# Patient Record
Sex: FEMALE | Race: WHITE | NOT HISPANIC OR LATINO | Employment: FULL TIME | URBAN - METROPOLITAN AREA
[De-identification: names, ages, dates, MRNs, and addresses within clinical notes are randomized per-mention and may not be internally consistent; named-entity substitution may affect disease eponyms.]

---

## 2017-03-03 ENCOUNTER — OFFICE VISIT (OUTPATIENT)
Dept: FAMILY MEDICINE | Facility: CLINIC | Age: 27
End: 2017-03-03
Payer: COMMERCIAL

## 2017-03-03 VITALS
DIASTOLIC BLOOD PRESSURE: 72 MMHG | HEIGHT: 61 IN | WEIGHT: 126.8 LBS | TEMPERATURE: 97.6 F | SYSTOLIC BLOOD PRESSURE: 114 MMHG | BODY MASS INDEX: 23.94 KG/M2 | HEART RATE: 66 BPM | OXYGEN SATURATION: 98 %

## 2017-03-03 DIAGNOSIS — J45.20 INTERMITTENT ASTHMA, UNCOMPLICATED: ICD-10-CM

## 2017-03-03 DIAGNOSIS — R10.13 DYSPEPSIA: ICD-10-CM

## 2017-03-03 DIAGNOSIS — K92.1 MELENA: Primary | ICD-10-CM

## 2017-03-03 LAB
ERYTHROCYTE [DISTWIDTH] IN BLOOD BY AUTOMATED COUNT: 13.6 % (ref 10–15)
FERRITIN SERPL-MCNC: 27 NG/ML (ref 12–150)
HCT VFR BLD AUTO: 40.2 % (ref 35–47)
HGB BLD-MCNC: 13.1 G/DL (ref 11.7–15.7)
IRON SATN MFR SERPL: 18 % (ref 15–46)
IRON SERPL-MCNC: 68 UG/DL (ref 35–180)
MCH RBC QN AUTO: 29.4 PG (ref 26.5–33)
MCHC RBC AUTO-ENTMCNC: 32.6 G/DL (ref 31.5–36.5)
MCV RBC AUTO: 90 FL (ref 78–100)
PLATELET # BLD AUTO: 326 10E9/L (ref 150–450)
RBC # BLD AUTO: 4.45 10E12/L (ref 3.8–5.2)
TIBC SERPL-MCNC: 372 UG/DL (ref 240–430)
TSH SERPL DL<=0.005 MIU/L-ACNC: 2.32 MU/L (ref 0.4–4)
WBC # BLD AUTO: 4.9 10E9/L (ref 4–11)

## 2017-03-03 PROCEDURE — 83550 IRON BINDING TEST: CPT | Performed by: INTERNAL MEDICINE

## 2017-03-03 PROCEDURE — 36415 COLL VENOUS BLD VENIPUNCTURE: CPT | Performed by: INTERNAL MEDICINE

## 2017-03-03 PROCEDURE — 82728 ASSAY OF FERRITIN: CPT | Performed by: INTERNAL MEDICINE

## 2017-03-03 PROCEDURE — 84443 ASSAY THYROID STIM HORMONE: CPT | Performed by: INTERNAL MEDICINE

## 2017-03-03 PROCEDURE — 83540 ASSAY OF IRON: CPT | Performed by: INTERNAL MEDICINE

## 2017-03-03 PROCEDURE — 99214 OFFICE O/P EST MOD 30 MIN: CPT | Performed by: INTERNAL MEDICINE

## 2017-03-03 PROCEDURE — 85027 COMPLETE CBC AUTOMATED: CPT | Performed by: INTERNAL MEDICINE

## 2017-03-03 RX ORDER — ALBUTEROL SULFATE 90 UG/1
2 AEROSOL, METERED RESPIRATORY (INHALATION) EVERY 6 HOURS PRN
Qty: 1 INHALER | Refills: 11 | Status: SHIPPED | OUTPATIENT
Start: 2017-03-03 | End: 2020-07-03

## 2017-03-03 NOTE — PROGRESS NOTES
"[facility]  CLINIC PROGRESS NOTE    Subjective:  Intermittent asthma, uncomplicated    No issues, has needed albuterol prior to exercise only  Dyspepsia   Has had symptoms recently of stomach cramping, diarrhea, chills and sweats that occur about 12 hours after eating peppers or spicy foods.  She will also notice dark stools.  She does recall a past history of H. Pylori in her childhood.  She has normal color stools usually.  Also notes cramping and occasional constipation alternating with loose stools with vegetables.  She is drinking a good amount of water.  Not taking any NSAIDs.  No additional alcohol.  She has been drinking a higher quantities of coffee 6 x 8 oz of coffee per day.  She feels she needs more sleep.      Past medical history, medications, allergies, social history, family history reviewed and updated in [facility] as of 3/3/2017 .    ROS  CONSTITUTIONAL: no fatigue, no unexpected change in weight  SKIN: no worrisome rashes, no worrisome moles, no worrisome lesions  EYES: no acute vision problems or changes  ENT: no ear problems, no mouth problems, no throat problems  RESP: no significant cough, no shortness of breath  CV: no chest pain, no palpitations, no new or worsening peripheral edema  GI: no nausea, no vomiting, no constipation, no diarrhea  : no frequency, no dysuria, no hematuria  MS: no claudication, no myalgias, no joint aches  PSYCHIATRIC: no changes in mood or affect      Objective:  Vitals  /72 (BP Location: Right arm, Patient Position: Chair, Cuff Size: Adult Regular)  Pulse 66  Temp 97.6  F (36.4  C) (Oral)  Ht 5' 1\" (1.549 m)  Wt 126 lb 12.8 oz (57.5 kg)  LMP 02/22/2017  SpO2 98%  Breastfeeding? No  BMI 23.96 kg/m2  GEN: Alert Oriented x3 NAD  HEENT: Atraumatic, normocephalic, neck supple, no thyromegaly, negative cervical adenopathy  TM: TM bilaterally pearly and grey with normal light reflex  CV: RRR no murmurs or rubs  PULM: CTA no wheezes or crackles  ABD: Soft, " nontender nondistended, no hepatosplenomegally  SKIN: No visible skin lesion or ulcerations  EXT: 2+ dorsal pedis pulses, no edema bilateral lower extremities  NEURO: Gait and station deferred, No focal neurologic deficits  PSYCH: Mood good, affect mood congruent    Results for orders placed or performed in visit on 03/03/17 (from the past 24 hour(s))   CBC with platelets   Result Value Ref Range    WBC 4.9 4.0 - 11.0 10e9/L    RBC Count 4.45 3.8 - 5.2 10e12/L    Hemoglobin 13.1 11.7 - 15.7 g/dL    Hematocrit 40.2 35.0 - 47.0 %    MCV 90 78 - 100 fl    MCH 29.4 26.5 - 33.0 pg    MCHC 32.6 31.5 - 36.5 g/dL    RDW 13.6 10.0 - 15.0 %    Platelet Count 326 150 - 450 10e9/L       Assessment/Plan:  Patient Instructions   (K92.1) Melena  (primary encounter diagnosis)  Comment: given symptoms of abdominal pain, cramping and dark stools we will refer for upper endoscopy and also discussion with gastroenterologist.  OK to conitnue off of gluten and we will add a trial of omeprazole proton pump inhibitor and this could be helpful diagnostically and therapeutically.  Avoid NSAIDs (ibuprofen, alleve).  Caffeine may be a trigger, lets how symptoms respond with reduction from 6 to 1 or none cups per day.  Plan: Ferritin, Iron and iron binding capacity, CBC         with platelets, H Pylori antigen, stool,         GASTROENTEROLOGY ADULT REF PROCEDURE ONLY,         GASTROENTEROLOGY ADULT REF CONSULT ONLY,         omeprazole (PRILOSEC) 20 MG CR capsule, Fecal         colorectal cancer screen (FIT)            (J45.20) Intermittent asthma, uncomplicated  Comment: refill albuterol as needed   Plan: albuterol (PROAIR HFA/PROVENTIL HFA/VENTOLIN         HFA) 108 (90 BASE) MCG/ACT Inhaler, CBC with         platelets, TSH with free T4 reflex            (R10.13) Dyspepsia  Comment: will check labs today   Plan: CBC with platelets, TSH with free T4 reflex                Follow up pending labs     Disclaimer: This note consists of symbols  derived from keyboarding, dictation and/or voice recognition software. As a result, there may be errors in the script that have gone undetected. Please consider this when interpreting information found in this chart.    Esa Ahn MD  [phone]

## 2017-03-03 NOTE — PATIENT INSTRUCTIONS
(K92.1) Melena  (primary encounter diagnosis)  Comment: given symptoms of abdominal pain, cramping and dark stools we will refer for upper endoscopy and also discussion with gastroenterologist.  OK to conitnue off of gluten and we will add a trial of omeprazole proton pump inhibitor and this could be helpful diagnostically and therapeutically.  Avoid NSAIDs (ibuprofen, alleve).  Caffeine may be a trigger, lets how symptoms respond with reduction from 6 to 1 or none cups per day.  Plan: Ferritin, Iron and iron binding capacity, CBC         with platelets, H Pylori antigen, stool,         GASTROENTEROLOGY ADULT REF PROCEDURE ONLY,         GASTROENTEROLOGY ADULT REF CONSULT ONLY,         omeprazole (PRILOSEC) 20 MG CR capsule, Fecal         colorectal cancer screen (FIT)            (J45.20) Intermittent asthma, uncomplicated  Comment: refill albuterol as needed   Plan: albuterol (PROAIR HFA/PROVENTIL HFA/VENTOLIN         HFA) 108 (90 BASE) MCG/ACT Inhaler, CBC with         platelets, TSH with free T4 reflex            (R10.13) Dyspepsia  Comment: will check labs today   Plan: CBC with platelets, TSH with free T4 reflex

## 2017-03-03 NOTE — MR AVS SNAPSHOT
After Visit Summary   3/3/2017    Deepti Mancia    MRN: [id number]           Patient Information     Date Of Birth          1990        Visit Information        Provider Department      3/3/2017 8:30 AM Esa Ahn MD [facility]        Today's Diagnoses     Melena    -  1    Intermittent asthma, uncomplicated        Dyspepsia          Care Instructions    (K92.1) Melena  (primary encounter diagnosis)  Comment: given symptoms of abdominal pain, cramping and dark stools we will refer for upper endoscopy and also discussion with gastroenterologist.  OK to conitnue off of gluten and we will add a trial of omeprazole proton pump inhibitor and this could be helpful diagnostically and therapeutically.  Avoid NSAIDs (ibuprofen, alleve).  Caffeine may be a trigger, lets how symptoms respond with reduction from 6 to 1 or none cups per day.  Plan: Ferritin, Iron and iron binding capacity, CBC         with platelets, H Pylori antigen, stool,         GASTROENTEROLOGY ADULT REF PROCEDURE ONLY,         GASTROENTEROLOGY ADULT REF CONSULT ONLY,         omeprazole (PRILOSEC) 20 MG CR capsule, Fecal         colorectal cancer screen (FIT)            (J45.20) Intermittent asthma, uncomplicated  Comment: refill albuterol as needed   Plan: albuterol (PROAIR HFA/PROVENTIL HFA/VENTOLIN         HFA) 108 (90 BASE) MCG/ACT Inhaler, CBC with         platelets, TSH with free T4 reflex            (R10.13) Dyspepsia  Comment: will check labs today   Plan: CBC with platelets, TSH with free T4 reflex                    Follow-ups after your visit        Additional Services     GASTROENTEROLOGY ADULT REF CONSULT ONLY       Preferred Location: [facility] [phone]      Please be aware that coverage of these services is subject to the terms and limitations of your health insurance plan.  Call member services at your health plan with any benefit or coverage questions.  Any procedures must be performed at a  [facility] [place] facility OR coordinated by your clinic's referral office.    Please bring the following with you to your appointment:    (1) Any X-Rays, CTs or MRIs which have been performed.  Contact the facility where they were done to arrange for  prior to your scheduled appointment.    (2) List of current medications   (3) This referral request   (4) Any documents/labs given to you for this referral            GASTROENTEROLOGY ADULT REF PROCEDURE ONLY       [facility] [phone]      Last Lab Result: No results found for: CR  Body mass index is 23.96 kg/(m^2).      Patient will be contacted to schedule procedure.     Please be aware that coverage of these services is subject to the terms and limitations of your health insurance plan.  Call member services at your health plan with any benefit or coverage questions.  Any procedures must be performed at a [place] [facility] facility OR coordinated by your clinic's referral office.    Please bring the following with you to your appointment:    (1) Any X-Rays, CTs or MRIs which have been performed.  Contact the facility where they were done to arrange for  prior to your scheduled appointment.    (2) List of current medications   (3) This referral request   (4) Any documents/labs given to you for this referral                  Future tests that were ordered for you today     Open Future Orders        Priority Expected Expires Ordered    H Pylori antigen, stool Routine  4/2/2017 3/3/2017    Fecal colorectal cancer screen (FIT) Routine 3/24/2017 5/26/2017 3/3/2017            Who to contact     If you have questions or need follow up information about today's clinic visit or your schedule please contact [facility] directly at [phone].  Normal or non-critical lab and imaging results will be communicated to you by MyChart, letter or phone within 4 business days after the clinic has received the results. If you do not hear from us within 7 days, please contact  "the clinic through [email or web address] or phone. If you have a critical or abnormal lab result, we will notify you by phone as soon as possible.  Submit refill requests through [email or web address] or call your pharmacy and they will forward the refill request to us. Please allow 3 business days for your refill to be completed.          Additional Information About Your Visit        [employer] Information     [email or web address] gives you secure access to your electronic health record. If you see a primary care provider, you can also send messages to your care team and make appointments. If you have questions, please call your primary care clinic.  If you do not have a primary care provider, please call [phone] and they will assist you.        Care EveryWhere ID     This is your Care EveryWhere ID. This could be used by other organizations to access your [place] medical records  [id number]        Your Vitals Were     Pulse Temperature Height Last Period Pulse Oximetry Breastfeeding?    66 97.6  F (36.4  C) (Oral) 5' 1\" (1.549 m) 02/22/2017 98% No    BMI (Body Mass Index)                   23.96 kg/m2            Blood Pressure from Last 3 Encounters:   03/03/17 114/72   08/17/16 116/70   06/03/16 110/76    Weight from Last 3 Encounters:   03/03/17 126 lb 12.8 oz (57.5 kg)   08/17/16 122 lb (55.3 kg)   06/03/16 122 lb (55.3 kg)              We Performed the Following     CBC with platelets     Ferritin     GASTROENTEROLOGY ADULT REF CONSULT ONLY     GASTROENTEROLOGY ADULT REF PROCEDURE ONLY     Iron and iron binding capacity     TSH with free T4 reflex          Today's Medication Changes          These changes are accurate as of: 3/3/17  8:59 AM.  If you have any questions, ask your nurse or doctor.               Start taking these medicines.        Dose/Directions    omeprazole 20 MG CR capsule   Commonly known as:  priLOSEC   Used for:  Melena   Started by:  Esa Ahn MD        Dose:  20 mg   Take 1 capsule (20 mg) by mouth daily "   Quantity:  14 capsule   Refills:  3         These medicines have changed or have updated prescriptions.        Dose/Directions    * albuterol 108 (90 BASE) MCG/ACT Inhaler   Commonly known as:  PROVENTIL HFA   This may have changed:  Another medication with the same name was added. Make sure you understand how and when to take each.   Used for:  Asthma with exacerbation   Changed by:  Medhat Woodward PA-C        Dose:  2 puff   Inhale 2 puffs into the lungs every 6 hours   Quantity:  1 Inhaler   Refills:  0       * albuterol 108 (90 BASE) MCG/ACT Inhaler   Commonly known as:  PROAIR HFA/PROVENTIL HFA/VENTOLIN HFA   This may have changed:  You were already taking a medication with the same name, and this prescription was added. Make sure you understand how and when to take each.   Used for:  Intermittent asthma, uncomplicated   Changed by:  Esa Ahn MD        Dose:  2 puff   Inhale 2 puffs into the lungs every 6 hours as needed for shortness of breath / dyspnea or wheezing   Quantity:  1 Inhaler   Refills:  11       * Notice:  This list has 2 medication(s) that are the same as other medications prescribed for you. Read the directions carefully, and ask your doctor or other care provider to review them with you.      Stop taking these medicines if you haven't already. Please contact your care team if you have questions.     guaiFENesin-codeine 100-10 MG/5ML Soln solution   Commonly known as:  ROBITUSSIN AC   Stopped by:  Esa Ahn MD           ofloxacin 0.3 % ophthalmic solution   Commonly known as:  OCUFLOX   Stopped by:  Esa Ahn MD                Where to get your medicines      These medications were sent to [facility]/pharmacy #[id number] [place], [place] - [address]  [address] [address]     Phone:  [phone]     albuterol 108 (90 BASE) MCG/ACT Inhaler         Some of these will need a paper prescription and others can be bought over the counter.  Ask your  nurse if you have questions.     You don't need a prescription for these medications     omeprazole 20 MG CR capsule                Primary Care Provider Office Phone # Fax #    Eas Ahn [phone]       [facility] [address] [facility] [address]  [facility] [address]        Thank you!     Thank you for choosing [facility]  for your care. Our goal is always to provide you with excellent care. Hearing back from our patients is one way we can continue to improve our services. Please take a few minutes to complete the written survey that you may receive in the mail after your visit with us. Thank you!             Your Updated Medication List - Protect others around you: Learn how to safely use, store and throw away your medicines at www.disposemymeds.org.          This list is accurate as of: 3/3/17  8:59 AM.  Always use your most recent med list.                   Brand Name Dispense Instructions for use    * albuterol 108 (90 BASE) MCG/ACT Inhaler    PROVENTIL HFA    1 Inhaler    Inhale 2 puffs into the lungs every 6 hours       * albuterol 108 (90 BASE) MCG/ACT Inhaler    PROAIR HFA/PROVENTIL HFA/VENTOLIN HFA    1 Inhaler    Inhale 2 puffs into the lungs every 6 hours as needed for shortness of breath / dyspnea or wheezing       ALLEGRA 180 MG tablet   Generic drug:  fexofenadine      Take 180 mg by mouth daily as needed One a day       omeprazole 20 MG CR capsule    priLOSEC    14 capsule    Take 1 capsule (20 mg) by mouth daily       SINGULAIR 10 MG tablet   Generic drug:  montelukast      Take 10 mg by mouth nightly as needed       * Notice:  This list has 2 medication(s) that are the same as other medications prescribed for you. Read the directions carefully, and ask your doctor or other care provider to review them with you.

## 2017-03-03 NOTE — NURSING NOTE
"Chief Complaint   Patient presents with     Gastrointestinal Problem     stomach pain, more reflux, severe cramps and diarrhea after spicy food       Initial /72 (BP Location: Right arm, Patient Position: Chair, Cuff Size: Adult Regular)  Pulse 66  Temp 97.6  F (36.4  C) (Oral)  Ht 5' 1\" (1.549 m)  Wt 126 lb 12.8 oz (57.5 kg)  LMP 02/22/2017  SpO2 98%  Breastfeeding? No  BMI 23.96 kg/m2 Estimated body mass index is 23.96 kg/(m^2) as calculated from the following:    Height as of this encounter: 5' 1\" (1.549 m).    Weight as of this encounter: 126 lb 12.8 oz (57.5 kg).  Medication Reconciliation: complete.  Isa Hall, CMA    "

## 2017-03-04 ASSESSMENT — ASTHMA QUESTIONNAIRES: ACT_TOTALSCORE: 25

## 2017-03-04 NOTE — PROGRESS NOTES
Jabier Tatum,    I have had the opportunity to review your recent results and an interpretation is as follows:  Your complete blood counts shows stable blood counts  Your thyroid funciton is also stable and within normal limits  Your iron levels are low normal - I would recommend pursuing the upper endoscopy as we discussed and you could consider adding an iron supplement pending results.      Sincerely,  Esa Ahn MD

## 2017-07-01 ENCOUNTER — HEALTH MAINTENANCE LETTER (OUTPATIENT)
Age: 27
End: 2017-07-01

## 2019-01-21 ENCOUNTER — OFFICE VISIT (OUTPATIENT)
Dept: URGENT CARE | Facility: URGENT CARE | Age: 29
End: 2019-01-21
Payer: COMMERCIAL

## 2019-01-21 VITALS
HEART RATE: 84 BPM | BODY MASS INDEX: 24.61 KG/M2 | DIASTOLIC BLOOD PRESSURE: 66 MMHG | WEIGHT: 130.25 LBS | SYSTOLIC BLOOD PRESSURE: 112 MMHG | TEMPERATURE: 99.5 F

## 2019-01-21 DIAGNOSIS — R52 BODY ACHES: ICD-10-CM

## 2019-01-21 DIAGNOSIS — R50.9 FEVER IN ADULT: Primary | ICD-10-CM

## 2019-01-21 DIAGNOSIS — J02.0 STREP THROAT: ICD-10-CM

## 2019-01-21 LAB
DEPRECATED S PYO AG THROAT QL EIA: ABNORMAL
FLUAV+FLUBV AG SPEC QL: NEGATIVE
FLUAV+FLUBV AG SPEC QL: NEGATIVE
SPECIMEN SOURCE: ABNORMAL
SPECIMEN SOURCE: NORMAL

## 2019-01-21 PROCEDURE — 87880 STREP A ASSAY W/OPTIC: CPT | Performed by: PHYSICIAN ASSISTANT

## 2019-01-21 PROCEDURE — 99214 OFFICE O/P EST MOD 30 MIN: CPT | Performed by: PHYSICIAN ASSISTANT

## 2019-01-21 PROCEDURE — 87804 INFLUENZA ASSAY W/OPTIC: CPT | Performed by: PHYSICIAN ASSISTANT

## 2019-01-21 RX ORDER — AMOXICILLIN 875 MG
875 TABLET ORAL 2 TIMES DAILY
Qty: 20 TABLET | Refills: 0 | Status: SHIPPED | OUTPATIENT
Start: 2019-01-21 | End: 2019-01-31

## 2019-01-21 RX ORDER — IBUPROFEN 200 MG
400 TABLET ORAL PRN
COMMUNITY
End: 2020-07-03

## 2019-01-21 NOTE — PROGRESS NOTES
SUBJECTIVE:  Deepti Mancia is a 28 year old female with a chief complaint of sore throat.  Onset of symptoms was 2 day(s) ago.    Course of illness: gradual onset.  Severity moderate  Current and Associated symptoms: fever and sore throat  Treatment measures tried include Tylenol/Ibuprofen.  Predisposing factors include recent illness?.    PMH  Allergies    Allergies   Allergen Reactions     Sulfa Drugs Rash     Social History     Tobacco Use     Smoking status: Never Smoker     Smokeless tobacco: Never Used   Substance Use Topics     Alcohol use: Yes     Comment: once or twice a week     Family History   Problem Relation Age of Onset     Thyroid Disease Mother         hypo thyroidism     Arthritis Father 59        rheumatoid arthritis     Lipids Father      Coronary Artery Disease Father 60     Asthma Maternal Grandmother      Asthma Sister      Thyroid Disease Sister 22        hypothyroidism     Thyroid Disease Sister 22        hypothyroidism     C.A.D. No family hx of      Diabetes No family hx of      Hypertension No family hx of      Cerebrovascular Disease No family hx of      Breast Cancer No family hx of      Cancer - colorectal No family hx of      Prostate Cancer No family hx of      Alzheimer Disease No family hx of      Cancer No family hx of      Depression No family hx of      Heart Disease No family hx of        ROS:  CONSTITUTIONAL:POSITIVE  for fever and chills  INTEGUMENTARY/SKIN: NEGATIVE for worrisome rashes, moles or lesions  EYES: NEGATIVE for vision changes or irritation  ENT/MOUTH: Positive for throat pain  RESP:NEGATIVE for significant cough or SOB  CV: NEGATIVE for chest pain, palpitations or peripheral edema  GI: NEGATIVE for nausea, abdominal pain, heartburn, or change in bowel habits  : normal menstrual cycles  MUSCULOSKELETAL: Positive for body aches  NEURO: NEGATIVE for weakness, dizziness or paresthesias    OBJECTIVE:   /66   Pulse 84   Temp 99.5  F (37.5  C) (Oral)   Wt  59.1 kg (130 lb 4 oz)   BMI 24.61 kg/m    GENERAL APPEARANCE: healthy, alert and no distress  HENT: ear canals and TM's normal.  Nose normal.  Pharynx erythematous with some exudate noted.  NECK: supple, non-tender to palpation, no adenopathy noted  RESP: lungs clear to auscultation - no rales, rhonchi or wheezes  CV: regular rates and rhythm, normal S1 S2, no murmur noted  ABDOMEN:  soft, nontender, no HSM or masses and bowel sounds normal  SKIN: no suspicious lesions or rashes    Results for orders placed or performed in visit on 01/21/19   Rapid strep screen   Result Value Ref Range    Specimen Description Throat     Rapid Strep A Screen (A)      POSITIVE: Group A Streptococcal antigen detected by immunoassay.   Influenza A/B antigen   Result Value Ref Range    Influenza A/B Agn Specimen Nasal     Influenza A Negative NEG^Negative    Influenza B Negative NEG^Negative         ASSESSMENT/PLAN      ICD-10-CM    1. Fever in adult R50.9 Rapid strep screen     Influenza A/B antigen     amoxicillin (AMOXIL) 875 MG tablet   2. Strep throat J02.0 amoxicillin (AMOXIL) 875 MG tablet     magic mouthwash (ENTER INGREDIENTS IN COMMENTS) suspension   3. Body aches R52 magic mouthwash (ENTER INGREDIENTS IN COMMENTS) suspension           Fever in adult  Strep throat  Body aches  Orders Placed This Encounter     ibuprofen (ADVIL/MOTRIN) 200 MG tablet     amoxicillin (AMOXIL) 875 MG tablet     magic mouthwash (ENTER INGREDIENTS IN COMMENTS) suspension       Strep culture pending  Contagious fro 24 hrs  Follow up as needed

## 2019-02-16 ENCOUNTER — OFFICE VISIT (OUTPATIENT)
Dept: URGENT CARE | Facility: URGENT CARE | Age: 29
End: 2019-02-16
Payer: COMMERCIAL

## 2019-02-16 VITALS
TEMPERATURE: 100.2 F | WEIGHT: 127 LBS | OXYGEN SATURATION: 99 % | SYSTOLIC BLOOD PRESSURE: 104 MMHG | HEART RATE: 76 BPM | DIASTOLIC BLOOD PRESSURE: 70 MMHG | BODY MASS INDEX: 24 KG/M2 | RESPIRATION RATE: 16 BRPM

## 2019-02-16 DIAGNOSIS — J10.1 INFLUENZA A: Primary | ICD-10-CM

## 2019-02-16 DIAGNOSIS — R50.9 FEVER AND CHILLS: ICD-10-CM

## 2019-02-16 LAB
DEPRECATED S PYO AG THROAT QL EIA: NORMAL
FLUAV+FLUBV AG SPEC QL: NEGATIVE
FLUAV+FLUBV AG SPEC QL: POSITIVE
SPECIMEN SOURCE: ABNORMAL
SPECIMEN SOURCE: NORMAL

## 2019-02-16 PROCEDURE — 87880 STREP A ASSAY W/OPTIC: CPT | Performed by: FAMILY MEDICINE

## 2019-02-16 PROCEDURE — 87081 CULTURE SCREEN ONLY: CPT | Performed by: FAMILY MEDICINE

## 2019-02-16 PROCEDURE — 99214 OFFICE O/P EST MOD 30 MIN: CPT | Performed by: FAMILY MEDICINE

## 2019-02-16 PROCEDURE — 87804 INFLUENZA ASSAY W/OPTIC: CPT | Performed by: FAMILY MEDICINE

## 2019-02-16 RX ORDER — OSELTAMIVIR PHOSPHATE 75 MG/1
75 CAPSULE ORAL 2 TIMES DAILY
Qty: 10 CAPSULE | Refills: 0 | Status: SHIPPED | OUTPATIENT
Start: 2019-02-16 | End: 2019-02-21

## 2019-02-16 NOTE — PATIENT INSTRUCTIONS
Patient Education     Influenza (Adult)    Influenza is also called the flu. It is a viral illness that affects the air passages of your lungs. It is different from the common cold. The flu can easily be passed from one to person to another. It may be spread through the air by coughing and sneezing. Or it can be spread by touching the sick person and then touching your own eyes, nose, or mouth.  The flu starts 1 to 3 days after you are exposed to the flu virus. It may last for 1 to 2 weeks but many people feel tired or fatigued for many weeks afterward. You usually don t need to take antibiotics unless you have a complication. This might be an ear or sinus infection or pneumonia.  Symptoms of the flu may be mild or severe. They can include extreme tiredness (wanting to stay in bed all day), chills, fevers, muscle aches, soreness with eye movement, headache, and a dry, hacking cough.  Home care  Follow these guidelines when caring for yourself at home:    Avoid being around cigarette smoke, whether yours or other people s.    Acetaminophen or ibuprofen will help ease your fever, muscle aches, and headache. Don t give aspirin to anyone younger than 18 who has the flu. Aspirin can harm the liver.    Nausea and loss of appetite are common with the flu. Eat light meals. Drink 6 to 8 glasses of liquids every day. Good choices are water, sport drinks, soft drinks without caffeine, juices, tea, and soup. Extra fluids will also help loosen secretions in your nose and lungs.    Over-the-counter cold medicines will not make the flu go away faster. But the medicines may help with coughing, sore throat, and congestion in your nose and sinuses. Don t use a decongestant if you have high blood pressure.    Stay home until your fever has been gone for at least 24 hours without using medicine to reduce fever.  Follow-up care  Follow up with your healthcare provider, or as advised, if you are not getting better over the next  week.  If you are age 65 or older, talk with your provider about getting a pneumococcal vaccine every 5 years. You should also get this vaccine if you have chronic asthma or COPD. All adults should get a flu vaccine every fall. Ask your provider about this.  When to seek medical advice  Call your healthcare provider right away if any of these occur:    Cough with lots of colored mucus (sputum) or blood in your mucus    Chest pain, shortness of breath, wheezing, or trouble breathing    Severe headache, or face, neck, or ear pain    New rash with fever    Fever of 100.4 F (38 C) or higher, or as directed by your healthcare provider    Confusion, behavior change, or seizure    Severe weakness or dizziness    You get a new fever or cough after getting better for a few days  Date Last Reviewed: 1/1/2017 2000-2018 [employer]. [address] [place] [address], [place] [address]. All rights reserved. This information is not intended as a substitute for professional medical care. Always follow your healthcare professional's instructions.

## 2019-02-16 NOTE — PROGRESS NOTES
SUBJECTIVE: Deepti Mancia is a 28 year old female presenting with a chief complaint of fever, nasal congestion and cough .  Onset of symptoms was 2 day(s) ago.  Course of illness is worsening.    Severity moderate  Current and Associated symptoms: cough - non-productive  Treatment measures tried include Tylenol/Ibuprofen.  Predisposing factors include None.    No past medical history on file.  Allergies   Allergen Reactions     Sulfa Drugs Rash     Social History     Tobacco Use     Smoking status: Never Smoker     Smokeless tobacco: Never Used   Substance Use Topics     Alcohol use: Yes     Comment: once or twice a week       ROS:  SKIN: no rash  GI: no vomiting    OBJECTIVE:  /70 (Cuff Size: Adult Regular)   Pulse 76   Temp 100.2  F (37.9  C) (Oral)   Resp 16   Wt 57.6 kg (127 lb)   SpO2 99%   BMI 24.00 kg/m  GENERAL APPEARANCE: healthy, alert and no distress  EYES: EOMI,  PERRL, conjunctiva clear  HENT: ear canals and TM's normal.  Nose and mouth without ulcers, erythema or lesions  NECK: supple, nontender, no lymphadenopathy  RESP: lungs clear to auscultation - no rales, rhonchi or wheezes  CV: regular rates and rhythm, normal S1 S2, no murmur noted  SKIN: no suspicious lesions or rashes      ICD-10-CM    1. Influenza A J10.1 oseltamivir (TAMIFLU) 75 MG capsule   2. Fever and chills R50.9 Rapid strep screen     Influenza A/B antigen     Beta strep group A culture     Fluids/Rest, f/u if worse/not any better

## 2019-02-17 LAB
BACTERIA SPEC CULT: NORMAL
SPECIMEN SOURCE: NORMAL

## 2020-02-08 ENCOUNTER — HEALTH MAINTENANCE LETTER (OUTPATIENT)
Age: 30
End: 2020-02-08

## 2020-07-03 ENCOUNTER — VIRTUAL VISIT (OUTPATIENT)
Dept: INTERNAL MEDICINE | Facility: CLINIC | Age: 30
End: 2020-07-03
Payer: COMMERCIAL

## 2020-07-03 VITALS — HEIGHT: 61 IN | BODY MASS INDEX: 24.55 KG/M2 | WEIGHT: 130 LBS

## 2020-07-03 DIAGNOSIS — Z76.89 ENCOUNTER TO ESTABLISH CARE: Primary | ICD-10-CM

## 2020-07-03 DIAGNOSIS — R53.83 FATIGUE, UNSPECIFIED TYPE: ICD-10-CM

## 2020-07-03 DIAGNOSIS — Z13.29 SCREENING FOR THYROID DISORDER: ICD-10-CM

## 2020-07-03 PROBLEM — R10.13 DYSPEPSIA: Status: RESOLVED | Noted: 2017-03-03 | Resolved: 2020-07-03

## 2020-07-03 PROCEDURE — 99214 OFFICE O/P EST MOD 30 MIN: CPT | Mod: 95 | Performed by: INTERNAL MEDICINE

## 2020-07-03 SDOH — HEALTH STABILITY: MENTAL HEALTH: HOW OFTEN DO YOU HAVE 6 OR MORE DRINKS ON ONE OCCASION?: NEVER

## 2020-07-03 SDOH — HEALTH STABILITY: MENTAL HEALTH: HOW OFTEN DO YOU HAVE A DRINK CONTAINING ALCOHOL?: 2-3 TIMES A WEEK

## 2020-07-03 SDOH — HEALTH STABILITY: MENTAL HEALTH: HOW MANY STANDARD DRINKS CONTAINING ALCOHOL DO YOU HAVE ON A TYPICAL DAY?: 1 OR 2

## 2020-07-03 ASSESSMENT — MIFFLIN-ST. JEOR: SCORE: 1242.06

## 2020-07-03 NOTE — PROGRESS NOTES
"Deepti Mancia is a 30 year old female who is being evaluated via a billable telephone visit.      The patient has been notified of following:     \"This telephone visit will be conducted via a call between you and your physician/provider. We have found that certain health care needs can be provided without the need for a physical exam.  This service lets us provide the care you need with a short phone conversation.  If a prescription is necessary we can send it directly to your pharmacy.  If lab work is needed we can place an order for that and you can then stop by our lab to have the test done at a later time.    Telephone visits are billed at different rates depending on your insurance coverage. During this emergency period, for some insurers they may be billed the same as an in-person visit.  Please reach out to your insurance provider with any questions.    If during the course of the call the physician/provider feels a telephone visit is not appropriate, you will not be charged for this service.\"    Patient has given verbal consent for Telephone visit?  Yes    What phone number would you like to be contacted at? [phone]    How would you like to obtain your AVS? MyChart     TELEPHONE VISIT                                                      SUBJECTIVE:                                                      HPI: Deepti Mancia is a pleasant 30 year old female who requested a telephone visit to  establish care:    PMH, PSH, FH, SH, medications, allergies, immunizations, and preventative health measures reviewed and updated as appropriate.    She is due for a pap smear - will come in at a later date to have this performed.    Patient complains of mild but ongoing fatigue for the last several months.  Wakes up feeling tired even after sleeping for 12 hours. She has a strong family history of hypothyroidism. Her periods are regular.    ASSESSMENT/PLAN:                                                      (Z76.89) " Encounter to establish care  (primary encounter diagnosis)  Comment: PMH, PSH, FH, SH, medications, allergies, immunizations, and preventative health measures reviewed.   Plan: see below for plans.    (R53.83) Fatigue, unspecified type  (Z13.29) Screening for thyroid disorder  Comment: etiology unclear; will start by evaluating for potential organic/treatable causes.  Plan: patient will be contacted to schedule CBC, iron studies, CMP, TSH reflex, and vitamin D level when able; recommendations to follow.    Total time of call between patient and provider was 8 minutes.     (Chart documentation was completed, in part, with [employer] voice-recognition software. Even though reviewed, some grammatical, spelling, and word errors may remain.)    Vanesa Ramirez MD   [address] [address]  T: [phone], F: [phone]

## 2020-07-09 DIAGNOSIS — Z13.29 SCREENING FOR THYROID DISORDER: ICD-10-CM

## 2020-07-09 DIAGNOSIS — R53.83 FATIGUE, UNSPECIFIED TYPE: ICD-10-CM

## 2020-07-09 LAB
ERYTHROCYTE [DISTWIDTH] IN BLOOD BY AUTOMATED COUNT: 13.7 % (ref 10–15)
HCT VFR BLD AUTO: 42.4 % (ref 35–47)
HGB BLD-MCNC: 13.6 G/DL (ref 11.7–15.7)
MCH RBC QN AUTO: 29.8 PG (ref 26.5–33)
MCHC RBC AUTO-ENTMCNC: 32.1 G/DL (ref 31.5–36.5)
MCV RBC AUTO: 93 FL (ref 78–100)
PLATELET # BLD AUTO: 334 10E9/L (ref 150–450)
RBC # BLD AUTO: 4.56 10E12/L (ref 3.8–5.2)
WBC # BLD AUTO: 8.2 10E9/L (ref 4–11)

## 2020-07-09 PROCEDURE — 84443 ASSAY THYROID STIM HORMONE: CPT | Performed by: INTERNAL MEDICINE

## 2020-07-09 PROCEDURE — 83540 ASSAY OF IRON: CPT | Performed by: INTERNAL MEDICINE

## 2020-07-09 PROCEDURE — 36415 COLL VENOUS BLD VENIPUNCTURE: CPT | Performed by: INTERNAL MEDICINE

## 2020-07-09 PROCEDURE — 82728 ASSAY OF FERRITIN: CPT | Performed by: INTERNAL MEDICINE

## 2020-07-09 PROCEDURE — 82306 VITAMIN D 25 HYDROXY: CPT | Performed by: INTERNAL MEDICINE

## 2020-07-09 PROCEDURE — 85027 COMPLETE CBC AUTOMATED: CPT | Performed by: INTERNAL MEDICINE

## 2020-07-09 PROCEDURE — 80053 COMPREHEN METABOLIC PANEL: CPT | Performed by: INTERNAL MEDICINE

## 2020-07-09 PROCEDURE — 83550 IRON BINDING TEST: CPT | Performed by: INTERNAL MEDICINE

## 2020-07-10 LAB
ALBUMIN SERPL-MCNC: 4.1 G/DL (ref 3.4–5)
ALP SERPL-CCNC: 63 U/L (ref 40–150)
ALT SERPL W P-5'-P-CCNC: 20 U/L (ref 0–50)
ANION GAP SERPL CALCULATED.3IONS-SCNC: 9 MMOL/L (ref 3–14)
AST SERPL W P-5'-P-CCNC: 15 U/L (ref 0–45)
BILIRUB SERPL-MCNC: 0.3 MG/DL (ref 0.2–1.3)
BUN SERPL-MCNC: 9 MG/DL (ref 7–30)
CALCIUM SERPL-MCNC: 9 MG/DL (ref 8.5–10.1)
CHLORIDE SERPL-SCNC: 107 MMOL/L (ref 94–109)
CO2 SERPL-SCNC: 22 MMOL/L (ref 20–32)
CREAT SERPL-MCNC: 0.71 MG/DL (ref 0.52–1.04)
DEPRECATED CALCIDIOL+CALCIFEROL SERPL-MC: 30 UG/L (ref 20–75)
FERRITIN SERPL-MCNC: 24 NG/ML (ref 12–150)
GFR SERPL CREATININE-BSD FRML MDRD: >90 ML/MIN/{1.73_M2}
GLUCOSE SERPL-MCNC: 135 MG/DL (ref 70–99)
IRON SATN MFR SERPL: 27 % (ref 15–46)
IRON SERPL-MCNC: 100 UG/DL (ref 35–180)
POTASSIUM SERPL-SCNC: 4.8 MMOL/L (ref 3.4–5.3)
PROT SERPL-MCNC: 7.8 G/DL (ref 6.8–8.8)
SODIUM SERPL-SCNC: 138 MMOL/L (ref 133–144)
TIBC SERPL-MCNC: 372 UG/DL (ref 240–430)
TSH SERPL DL<=0.005 MIU/L-ACNC: 1.79 MU/L (ref 0.4–4)

## 2020-11-07 ENCOUNTER — HEALTH MAINTENANCE LETTER (OUTPATIENT)
Age: 30
End: 2020-11-07

## 2021-03-21 ENCOUNTER — HEALTH MAINTENANCE LETTER (OUTPATIENT)
Age: 31
End: 2021-03-21

## 2021-08-02 ENCOUNTER — OFFICE VISIT (OUTPATIENT)
Dept: URGENT CARE | Facility: URGENT CARE | Age: 31
End: 2021-08-02
Payer: COMMERCIAL

## 2021-08-02 VITALS
HEART RATE: 71 BPM | DIASTOLIC BLOOD PRESSURE: 80 MMHG | SYSTOLIC BLOOD PRESSURE: 120 MMHG | BODY MASS INDEX: 26.45 KG/M2 | RESPIRATION RATE: 20 BRPM | TEMPERATURE: 98.9 F | OXYGEN SATURATION: 98 % | WEIGHT: 140 LBS

## 2021-08-02 DIAGNOSIS — R05.9 COUGH: Primary | ICD-10-CM

## 2021-08-02 DIAGNOSIS — R07.0 THROAT PAIN: ICD-10-CM

## 2021-08-02 LAB — DEPRECATED S PYO AG THROAT QL EIA: NEGATIVE

## 2021-08-02 PROCEDURE — 99213 OFFICE O/P EST LOW 20 MIN: CPT | Performed by: FAMILY MEDICINE

## 2021-08-02 PROCEDURE — U0005 INFEC AGEN DETEC AMPLI PROBE: HCPCS | Performed by: FAMILY MEDICINE

## 2021-08-02 PROCEDURE — U0003 INFECTIOUS AGENT DETECTION BY NUCLEIC ACID (DNA OR RNA); SEVERE ACUTE RESPIRATORY SYNDROME CORONAVIRUS 2 (SARS-COV-2) (CORONAVIRUS DISEASE [COVID-19]), AMPLIFIED PROBE TECHNIQUE, MAKING USE OF HIGH THROUGHPUT TECHNOLOGIES AS DESCRIBED BY CMS-2020-01-R: HCPCS | Performed by: FAMILY MEDICINE

## 2021-08-03 LAB
GROUP A STREP BY PCR: NOT DETECTED
SARS-COV-2 RNA RESP QL NAA+PROBE: NEGATIVE

## 2021-08-12 NOTE — PROGRESS NOTES
SUBJECTIVE: Deepti Mancia is a 31 year old female presenting with a chief complaint of cough  and sore throat.  Onset of symptoms was day(s) ago.    Past Medical History:   Diagnosis Date     No known health problems      Allergies   Allergen Reactions     Sulfa Drugs Rash     Social History     Tobacco Use     Smoking status: Never Smoker     Smokeless tobacco: Never Used   Substance Use Topics     Alcohol use: Yes     Comment: occ       ROS:  SKIN: no rash  GI: no vomiting    OBJECTIVE:  /80   Pulse 71   Temp 98.9  F (37.2  C)   Resp 20   Wt 63.5 kg (140 lb)   LMP  (LMP Unknown)   SpO2 98%   BMI 26.45 kg/m  GENERAL APPEARANCE: healthy, alert and no distress  EYES: EOMI,  PERRL, conjunctiva clear  HENT: ear canals and TM's normal.  Nose and mouth without ulcers, erythema or lesions  RESP: lungs clear to auscultation - no rales, rhonchi or wheezes  SKIN: no suspicious lesions or rashes      ICD-10-CM    1. Cough  R05 Symptomatic COVID-19 Virus (Coronavirus) by PCR Nasopharyngeal   2. Throat pain  R07.0 Streptococcus A Rapid Scr w Reflx to PCR - Lab Collect     Group A Streptococcus PCR Throat Swab       Fluids/Rest, f/u if worse/not any better

## 2021-09-05 ENCOUNTER — HEALTH MAINTENANCE LETTER (OUTPATIENT)
Age: 31
End: 2021-09-05

## 2022-03-21 ENCOUNTER — OFFICE VISIT (OUTPATIENT)
Dept: URGENT CARE | Facility: URGENT CARE | Age: 32
End: 2022-03-21
Payer: COMMERCIAL

## 2022-03-21 VITALS
WEIGHT: 140 LBS | HEART RATE: 66 BPM | SYSTOLIC BLOOD PRESSURE: 115 MMHG | RESPIRATION RATE: 16 BRPM | TEMPERATURE: 97.4 F | OXYGEN SATURATION: 100 % | DIASTOLIC BLOOD PRESSURE: 80 MMHG | BODY MASS INDEX: 26.45 KG/M2

## 2022-03-21 DIAGNOSIS — R07.0 THROAT PAIN: Primary | ICD-10-CM

## 2022-03-21 DIAGNOSIS — H10.029 PINK EYE, UNSPECIFIED LATERALITY: ICD-10-CM

## 2022-03-21 LAB
DEPRECATED S PYO AG THROAT QL EIA: NEGATIVE
GROUP A STREP BY PCR: NOT DETECTED

## 2022-03-21 PROCEDURE — 99213 OFFICE O/P EST LOW 20 MIN: CPT | Performed by: FAMILY MEDICINE

## 2022-03-21 PROCEDURE — 87651 STREP A DNA AMP PROBE: CPT | Performed by: FAMILY MEDICINE

## 2022-03-21 PROCEDURE — U0003 INFECTIOUS AGENT DETECTION BY NUCLEIC ACID (DNA OR RNA); SEVERE ACUTE RESPIRATORY SYNDROME CORONAVIRUS 2 (SARS-COV-2) (CORONAVIRUS DISEASE [COVID-19]), AMPLIFIED PROBE TECHNIQUE, MAKING USE OF HIGH THROUGHPUT TECHNOLOGIES AS DESCRIBED BY CMS-2020-01-R: HCPCS | Performed by: FAMILY MEDICINE

## 2022-03-21 PROCEDURE — U0005 INFEC AGEN DETEC AMPLI PROBE: HCPCS | Performed by: FAMILY MEDICINE

## 2022-03-21 RX ORDER — TOBRAMYCIN 3 MG/ML
2 SOLUTION/ DROPS OPHTHALMIC 4 TIMES DAILY
Qty: 5 ML | Refills: 0 | Status: SHIPPED | OUTPATIENT
Start: 2022-03-21 | End: 2022-03-29

## 2022-03-21 NOTE — PROGRESS NOTES
SUBJECTIVE: Deepti Mancia is a 31 year old female presenting with a chief complaint of sore throat and red mattery eyes.  Onset of symptoms was day(s) ago.  Course of illness is worsening.    Current and Associated symptoms: none  Treatment measures tried include None tried.  Predisposing factors include None.    Past Medical History:   Diagnosis Date     No known health problems      Allergies   Allergen Reactions     Sulfa Drugs Rash     Social History     Tobacco Use     Smoking status: Never Smoker     Smokeless tobacco: Never Used   Substance Use Topics     Alcohol use: Yes     Comment: occ       ROS:  SKIN: no rash  GI: no vomiting    OBJECTIVE:  /80   Pulse 66   Temp 97.4  F (36.3  C) (Tympanic)   Resp 16   Wt 63.5 kg (140 lb)   SpO2 100%   BMI 26.45 kg/m  GENERAL APPEARANCE: healthy, alert and no distress  EYES: EOMI,  PERRL, conjunctiva red mattery  HENT: ear canals and TM's normal.  Nose and mouth without ulcers, erythema or lesions  RESP: lungs clear to auscultation - no rales, rhonchi or wheezes  SKIN: no suspicious lesions or rashes      ICD-10-CM    1. Throat pain  R07.0 Streptococcus A Rapid Screen w/Reflex to PCR     Symptomatic; Yes; 3/14/2022 COVID-19 Virus (Coronavirus) by PCR Nose     Group A Streptococcus PCR Throat Swab   2. Pink eye, unspecified laterality  H10.029 tobramycin (TOBREX) 0.3 % ophthalmic solution       Fluids/Rest, f/u if worse/not any better

## 2022-03-22 LAB — SARS-COV-2 RNA RESP QL NAA+PROBE: NEGATIVE

## 2022-03-29 ENCOUNTER — OFFICE VISIT (OUTPATIENT)
Dept: URGENT CARE | Facility: URGENT CARE | Age: 32
End: 2022-03-29
Payer: COMMERCIAL

## 2022-03-29 VITALS
HEART RATE: 79 BPM | SYSTOLIC BLOOD PRESSURE: 124 MMHG | OXYGEN SATURATION: 96 % | TEMPERATURE: 97.9 F | DIASTOLIC BLOOD PRESSURE: 82 MMHG

## 2022-03-29 DIAGNOSIS — I88.9 LYMPHADENITIS: ICD-10-CM

## 2022-03-29 DIAGNOSIS — R07.0 THROAT PAIN: Primary | ICD-10-CM

## 2022-03-29 DIAGNOSIS — J03.90 TONSILLITIS WITH EXUDATE: ICD-10-CM

## 2022-03-29 LAB
BASOPHILS # BLD AUTO: 0 10E3/UL (ref 0–0.2)
BASOPHILS NFR BLD AUTO: 0 %
EOSINOPHIL # BLD AUTO: 0.1 10E3/UL (ref 0–0.7)
EOSINOPHIL NFR BLD AUTO: 1 %
ERYTHROCYTE [DISTWIDTH] IN BLOOD BY AUTOMATED COUNT: 12.8 % (ref 10–15)
HCT VFR BLD AUTO: 39.4 % (ref 35–47)
HGB BLD-MCNC: 12.3 G/DL (ref 11.7–15.7)
LYMPHOCYTES # BLD AUTO: 2.1 10E3/UL (ref 0.8–5.3)
LYMPHOCYTES NFR BLD AUTO: 21 %
MCH RBC QN AUTO: 28.7 PG (ref 26.5–33)
MCHC RBC AUTO-ENTMCNC: 31.2 G/DL (ref 31.5–36.5)
MCV RBC AUTO: 92 FL (ref 78–100)
MONOCYTES # BLD AUTO: 0.9 10E3/UL (ref 0–1.3)
MONOCYTES NFR BLD AUTO: 9 %
MONOCYTES NFR BLD AUTO: NEGATIVE %
NEUTROPHILS # BLD AUTO: 7.1 10E3/UL (ref 1.6–8.3)
NEUTROPHILS NFR BLD AUTO: 69 %
PLATELET # BLD AUTO: 398 10E3/UL (ref 150–450)
RBC # BLD AUTO: 4.28 10E6/UL (ref 3.8–5.2)
WBC # BLD AUTO: 10.3 10E3/UL (ref 4–11)

## 2022-03-29 PROCEDURE — 86308 HETEROPHILE ANTIBODY SCREEN: CPT | Performed by: PHYSICIAN ASSISTANT

## 2022-03-29 PROCEDURE — 86665 EPSTEIN-BARR CAPSID VCA: CPT | Performed by: PHYSICIAN ASSISTANT

## 2022-03-29 PROCEDURE — 36415 COLL VENOUS BLD VENIPUNCTURE: CPT | Performed by: PHYSICIAN ASSISTANT

## 2022-03-29 PROCEDURE — 99214 OFFICE O/P EST MOD 30 MIN: CPT | Performed by: PHYSICIAN ASSISTANT

## 2022-03-29 PROCEDURE — 85025 COMPLETE CBC W/AUTO DIFF WBC: CPT | Performed by: PHYSICIAN ASSISTANT

## 2022-03-29 RX ORDER — ACETAMINOPHEN 500 MG
1000 TABLET ORAL EVERY 6 HOURS PRN
COMMUNITY
End: 2022-08-18

## 2022-03-29 RX ORDER — PREDNISONE 20 MG/1
20 TABLET ORAL 2 TIMES DAILY
Qty: 10 TABLET | Refills: 0 | Status: SHIPPED | OUTPATIENT
Start: 2022-03-29 | End: 2022-08-18

## 2022-03-29 RX ORDER — CLINDAMYCIN HCL 300 MG
300 CAPSULE ORAL 3 TIMES DAILY
Qty: 30 CAPSULE | Refills: 0 | Status: SHIPPED | OUTPATIENT
Start: 2022-03-29 | End: 2022-04-08

## 2022-03-29 RX ORDER — IBUPROFEN 200 MG
400 TABLET ORAL EVERY 4 HOURS PRN
COMMUNITY
End: 2022-08-18

## 2022-03-29 NOTE — PATIENT INSTRUCTIONS
Patient Education     Tonsillitis in Adults  Tonsillitis is swelling and redness (inflammation) of the tonsils. It happens when the tonsils are infected by a virus or a bacteria. Your tonsils are 2 pink, oval lymph glands at the back of your throat. They are part of your immune system, which helps your body fight infection. They react when germs get inside your nose and mouth.  Tonsillitis is very common. It is most often seen in children, but it can also occur in young adults.  The viruses and bacteria that cause tonsillitis can be easily passed from one person to another.    What causes tonsillitis?  Tonsillitis is most often caused by a virus.  Common viruses that cause tonsillitis include:    Cold viruses    Adenoviruses    Agustín-Barr virus    Infectious mononucleosis    Herpes simplex virus (HSV)    Cytomegalovirus    Measles  In some cases tonsillitis is caused by a bacteria. Bacterial tonsillitis is often called strep throat. The most common type of bacteria that causes tonsillitis is GABS (Group A beta-hemolytic streptococcus). The bacteria is spread through droplets in the air. This happens when someone with the virus coughs or sneezes. It can also be spread by sharing food or drinks.  Symptoms of tonsillitis  Symptoms will depend on which type of tonsillitis you have. There are several types of tonsillitis.  Acute tonsillitis  Symptoms for this type often go away in a few days. But they can last up to 2 weeks. In some cases symptoms come back after treatment is done (acute recurrent tonsillitis). Symptoms include:    Fever    Sore throat    Bad breath    Trouble swallowing    Fluid loss (dehydration)    Sore lymph nodes in the neck    Tiredness    Snoring, sleep apnea, or breathing through the mouth    White patches, pus, or red tonsils    A red rash on the body  Chronic tonsillitis  For this type, the infection or inflammation lasts for a few months. Symptoms include:    Lasting sore throat    Bad  breath    Lasting sore lymph nodes in the neck    Bacteria and debris collecting on the tonsils (called tonsil stones)  Peritonsillar abscess  This is a severe form of tonsillitis. It occurs when a pocket of pus (an abscess) forms around the tonsil. You need treatment right away. This can help stop the abscess from blocking your airway. Symptoms include:    Severe throat pain    Trouble opening the mouth    Drooling    Voice sounds muffled    One tonsil may look larger  Diagnosing tonsillitis  If you have symptoms, see your primary healthcare provider. Or see an ear, nose, and throat doctor (ENT or otolaryngologist).  The provider will ask about your symptoms. He or she will also check your ear, nose, and throat for any swelling and infection. The provider will then swab your tonsils or the back of your throat. This sample can be checked in the provider s office for strep throat. This is called a rapid strep test. Results are ready in a few minutes. But there can be false negatives with this test. So the provider will likely also send the sample out to a lab for testing (throat culture). The lab results will take 24 hours or longer. But a throat culture is more accurate.  Treatment for tonsillitis  Treatment will depend on what is causing the tonsillitis. If it s caused by bacteria, then your provider may prescribe antibiotics to help you recover. Finish all of the medicine even if you start to feel better.  Tonsillitis caused by a virus can t be treated with antibiotics. This kind of infection often goes away on its own. Home care may be all that you need, with rest and fluids. Follow these tips to help ease your symptoms at home:    Get plenty of rest.    Drink lots of fluids, such as soup, broth, and tea with honey and lemon.    East soft foods such as ice cream, applesauce, and flavored gelatins.    Gargle with warm saltwater.    Use over-the-counter throat sprays or lozenges for throat pain.    Take  over-the-counter medicine for fever and pain, as directed.    Use a cool-mist humidifier to keep the air moist.  In severe cases, a person may be dehydrated or have a blocked airway. They may need to be hospitalized.  Surgery to remove the tonsils (tonsillectomy) may be needed if you have any of these:    Chronic tonsillitis    Tonsillitis that keeps coming back    Obstructive sleep apnea    Acute recurrent tonsillitis  If you have a peritonsillar abscess, surgery may be done to drain the abscess.  Preventing tonsillitis  Tonsillitis itself can t spread. But the virus and bacteria that cause it can be passed to other people.  No vaccine or medicine can prevent tonsillitis. These tips can help keep you from spreading or catching an illness that can cause tonsillitis:    Stay away from anyone with tonsillitis or a sore throat as much as possible.    Don't share utensils, drinking glasses, toothbrushes, or other personal objects with anyone who has tonsillitis or a sore throat.    Wash your hands correctly. Wash them often with soap and water often. Use hand  when you can t wash your hands.    Cover your mouth when you cough or sneeze.  Call 911  Call 911 if you have any of these symptoms:    Trouble breathing or speaking    Trouble swallowing or opening your mouth    Swollen mouth and throat    Drooling  When to call your healthcare provider  Call your healthcare provider if you have any of these symptoms:    Fever of 100.4 F (38 C) or higher, or as directed by your provider    A lump that gets larger    Worsening throat pain or neck pain    Unable to open your mouth fully (called lockjaw or trismus)    Neck stiffness    Bleeding    Painful swallowing    Feeling very ill or sick    Sore throat for more than 2 days     Heike last reviewed this educational content on 7/1/2019 2000-2021 [employer]. All rights reserved. This information is not intended as a substitute for professional medical  care. Always follow your healthcare professional's instructions.

## 2022-03-30 LAB
EBV VCA IGM SER IA-ACNC: 23.7 U/ML
EBV VCA IGM SER IA-ACNC: NORMAL

## 2022-04-17 ENCOUNTER — HEALTH MAINTENANCE LETTER (OUTPATIENT)
Age: 32
End: 2022-04-17

## 2022-08-12 ASSESSMENT — ENCOUNTER SYMPTOMS
MYALGIAS: 0
SHORTNESS OF BREATH: 0
DYSURIA: 0
FREQUENCY: 0
DIARRHEA: 0
ABDOMINAL PAIN: 0
WEAKNESS: 0
SORE THROAT: 0
COUGH: 0
HEARTBURN: 0
DIZZINESS: 0
HEADACHES: 0
PARESTHESIAS: 0
PALPITATIONS: 0
NAUSEA: 0
BREAST MASS: 0
HEMATOCHEZIA: 0
EYE PAIN: 0
ARTHRALGIAS: 0
HEMATURIA: 0
NERVOUS/ANXIOUS: 0
FEVER: 0
JOINT SWELLING: 0
CHILLS: 0
CONSTIPATION: 0

## 2022-08-19 ENCOUNTER — OFFICE VISIT (OUTPATIENT)
Dept: INTERNAL MEDICINE | Facility: CLINIC | Age: 32
End: 2022-08-19
Payer: COMMERCIAL

## 2022-08-19 VITALS
BODY MASS INDEX: 25.68 KG/M2 | OXYGEN SATURATION: 98 % | HEIGHT: 61 IN | HEART RATE: 79 BPM | SYSTOLIC BLOOD PRESSURE: 118 MMHG | WEIGHT: 136 LBS | TEMPERATURE: 98.5 F | DIASTOLIC BLOOD PRESSURE: 85 MMHG

## 2022-08-19 DIAGNOSIS — Z00.00 ROUTINE HISTORY AND PHYSICAL EXAMINATION OF ADULT: Primary | ICD-10-CM

## 2022-08-19 DIAGNOSIS — Z23 HIGH PRIORITY FOR 2019-NCOV VACCINE: ICD-10-CM

## 2022-08-19 DIAGNOSIS — Z23 NEED FOR VACCINATION: ICD-10-CM

## 2022-08-19 DIAGNOSIS — Z12.4 SCREENING FOR CERVICAL CANCER: ICD-10-CM

## 2022-08-19 PROCEDURE — 90471 IMMUNIZATION ADMIN: CPT | Performed by: INTERNAL MEDICINE

## 2022-08-19 PROCEDURE — G0145 SCR C/V CYTO,THINLAYER,RESCR: HCPCS | Performed by: INTERNAL MEDICINE

## 2022-08-19 PROCEDURE — 0064A COVID-19,PF,MODERNA (18+ YRS BOOSTER .25ML): CPT | Performed by: INTERNAL MEDICINE

## 2022-08-19 PROCEDURE — 87624 HPV HI-RISK TYP POOLED RSLT: CPT | Performed by: INTERNAL MEDICINE

## 2022-08-19 PROCEDURE — 91306 COVID-19,PF,MODERNA (18+ YRS BOOSTER .25ML): CPT | Performed by: INTERNAL MEDICINE

## 2022-08-19 PROCEDURE — 99395 PREV VISIT EST AGE 18-39: CPT | Mod: 25 | Performed by: INTERNAL MEDICINE

## 2022-08-19 PROCEDURE — 90714 TD VACC NO PRESV 7 YRS+ IM: CPT | Performed by: INTERNAL MEDICINE

## 2022-08-19 NOTE — PROGRESS NOTES
ASSESSMENT/PLAN                                                       (Z00.00) Routine history and physical examination of adult  (primary encounter diagnosis)  Comment: PMH, PSH, FH, SH, medications, allergies, immunizations, and preventative health measures reviewed and updated as appropriate.  Plan: see below for plans.      (Z23) Need for vaccination  Plan: TD given today.    (Z23) High priority for 2019-nCoV vaccine  Plan: COVID-19 booster given today.    (Z12.4) Screening for cervical cancer  Plan: pap obtained today.     Vanesa Ramirez MD   [place] [address] [address] [address]  T: [phone], F: [phone]    SUBJECTIVE                                                      Deepti Mancia is a very pleasant 32 year old female who presents for a physical.    ROS:  Constitutional: no unintentional weight loss or gain reported; no fevers, chills, or sweats reported  Cardiovascular: no chest pain, palpitations, or edema reported  Respiratory: no cough, wheezing, shortness of breath, or dyspnea on exertion reported  Gastrointestinal: no nausea, vomiting, constipation, diarrhea, or abdominal pain reported  Genitourinary: no urinary frequency, urgency, dysuria, or hematuria reported  Integumentary: no rash or pruritus reported  Musculoskeletal: no back pain, muscle pain, joint pain, or joint swelling reported  Neurologic: no focal weakness, numbness, or tingling reported  Hematologic: no easy bruising or bleeding reported  Endocrine: no heat or cold intolerance reported; no polyuria or polydipsia reported  Psychiatric: no anxiety or depression reported    Past Medical History:   Diagnosis Date     No pertinent past medical history      Past Surgical History:   Procedure Laterality Date     NO HISTORY OF SURGERY       Family History   Problem Relation Age of Onset     Cancer Mother         unknown gynecologic CA     Hypothyroidism Mother      Rheumatoid Arthritis Father      Coronary  "Artery Disease Father 59        MI and PCI     Cerebrovascular Disease Father      Hypothyroidism Sister         x2 sisters     Breast Cancer Maternal Grandmother      Melanoma Maternal Grandmother         unknown type     Coronary Artery Disease Early Onset No family hx of      Diabetes No family hx of      Colon Cancer No family hx of      Ovarian Cancer No family hx of      Social History     Occupational History     Occupation: "[employer]      Employer: [employer]   Tobacco Use     Smoking status: Never Smoker     Smokeless tobacco: Never Used   Vaping Use     Vaping Use: Never used   Substance and Sexual Activity     Alcohol use: Yes     Comment: 1-2 drinks/week     Drug use: No     Sexual activity: Yes     Partners: Female   Social History Narrative    Engaged!    No kids.    Runs 3 days/week.     Allergies   Allergen Reactions     Sulfa Drugs Rash     Immunization History   Administered Date(s) Administered     COVID-19,PF,Moderna 04/15/2021, 05/13/2021     HPV Quadrivalent 08/07/2007, 10/19/2007, 05/16/2008, 10/17/2008     Meningococcal (Menactra ) 08/07/2007     TDAP Vaccine (Adacel) 03/31/2011     PREVENTATIVE HEALTH                                                      BMI: overweight  Blood pressure: within normal limits   Breast CA screening: not medically indicated at this time   Cervical CA screening: DUE  Colon CA screening: not medically indicated at this time   Lung CA screening: n/a   Dexa: not medically indicated at this time   Screening cholesterol: not medically indicated at this time   Screening diabetes: not medically indicated at this time   STD testing: no risk factors present  Alcohol misuse screening: alcohol use reviewed - no intervention indicated at this time  Immunizations: reviewed; COVID-19 booster and tetanus booster DUE    OBJECTIVE                                                      /85   Pulse 79   Temp 98.5  F (36.9  C) (Oral)   Ht 1.549 m (5' 1\")  "  Wt 61.7 kg (136 lb)   SpO2 98%   BMI 25.70 kg/m    Constitutional: well-appearing  Head, Ears, and Eyes: normocephalic; normal external auditory canal and pinna; tympanic membranes visualized and normal; normal lids and conjunctivae  Neck: supple, symmetric, no thyromegaly or lymphadenopathy  Respiratory: normal respiratory effort; clear to auscultation bilaterally  Cardiovascular: regular rate and rhythm; no edema  Gastrointestinal: soft, non-tender, and non-distended; no organomegaly or masses  Genitourinary: external genitalia, urethral meatus, and vagina normal; cervix visualized and normal in appearance  Musculoskeletal: normal gait and station  Psych: normal judgment and insight; normal mood and affect; recent and remote memory intact    ---  (Note was completed, in part, with [employer] voice-recognition software. Documentation was reviewed, but some grammatical, spelling, and word errors may remain.)

## 2022-08-23 LAB
BKR LAB AP GYN ADEQUACY: NORMAL
BKR LAB AP GYN INTERPRETATION: NORMAL
BKR LAB AP HPV REFLEX: NORMAL
BKR LAB AP PREVIOUS ABNORMAL: NORMAL
PATH REPORT.COMMENTS IMP SPEC: NORMAL
PATH REPORT.COMMENTS IMP SPEC: NORMAL
PATH REPORT.RELEVANT HX SPEC: NORMAL

## 2022-08-25 LAB
HUMAN PAPILLOMA VIRUS 16 DNA: NEGATIVE
HUMAN PAPILLOMA VIRUS 18 DNA: NEGATIVE
HUMAN PAPILLOMA VIRUS FINAL DIAGNOSIS: NORMAL
HUMAN PAPILLOMA VIRUS OTHER HR: NEGATIVE

## 2022-10-23 ENCOUNTER — HEALTH MAINTENANCE LETTER (OUTPATIENT)
Age: 32
End: 2022-10-23

## 2022-11-23 ENCOUNTER — E-VISIT (OUTPATIENT)
Dept: URGENT CARE | Facility: CLINIC | Age: 32
End: 2022-11-23
Payer: COMMERCIAL

## 2022-11-23 DIAGNOSIS — Z20.822 SUSPECTED COVID-19 VIRUS INFECTION: Primary | ICD-10-CM

## 2022-11-23 PROCEDURE — 99421 OL DIG E/M SVC 5-10 MIN: CPT | Mod: CS | Performed by: NURSE PRACTITIONER

## 2022-11-24 ENCOUNTER — E-VISIT (OUTPATIENT)
Dept: URGENT CARE | Facility: CLINIC | Age: 32
End: 2022-11-24
Payer: COMMERCIAL

## 2022-11-24 DIAGNOSIS — R50.9 FEBRILE ILLNESS: Primary | ICD-10-CM

## 2022-11-24 PROCEDURE — 99421 OL DIG E/M SVC 5-10 MIN: CPT | Performed by: FAMILY MEDICINE

## 2022-11-24 NOTE — PATIENT INSTRUCTIONS
Dear Deepti,      Based on your responses, you may have coronavirus (COVID-19).     Will I be tested for COVID-19?  We would like to test you for COVID. I have placed orders for this test.     To schedule: go to your [facility] home page and scroll down to the section that says  You have an appointment that needs to be scheduled  and click the large green button that says  Schedule Now  and follow the steps to find the next available openings.    If you are unable to complete these [facility] scheduling steps, please call [phone] to schedule your testing.     These guidelines are for isolating before returning to work, school or .     For employers, schools and day cares: This is an official notice for this person s medical guidelines for returning in-person.     For health care sites: The CDC gives different isolation and quarantine guidelines for healthcare sites, please check with these sites before arriving.     How do I self-isolate?  You isolate when you have symptoms of COVID or a test shows you have COVID, even if you don t have symptoms.     If you DO have symptoms:  o Stay home and away from others  - For at least 5 days after your symptoms started, AND   - You are fever free for 24 hours (with no medicine that reduces fever), AND  - Your other symptoms are better.  o Wear a mask for 10 full days any time you are around others.    If you DON T have symptoms:  o Stay at home and away from others for at least 5 days after your positive test.  o Wear a mask for 10 full days any time you are around others.    How can I take care of myself?  Over the counter medications may help with your symptoms such as runny or stuffy nose, cough, chills, or fever.  Talk to your care team about your options.     Some people are at high risk of severe illness (for example, you have a weak immune system, you re 65 years or older, or you have certain medical problems). If your risk is high and your symptoms started in  the last 5 to 7 days, we strongly recommend for you to get COVID treatment as soon as possible. Paxlovid, Molnupiravir and the monoclonal antibody treatments are proven safe and effective, make you feel better faster, and prevent hospitalization and death.       To schedule an appointment to discuss COVID treatment, request an appointment on MyChart (select  COVID-19 Treatment ) or call [phone]), press [phone].      Get lots of rest. Drink extra fluids (unless a doctor has told you not to)    Take Tylenol (acetaminophen) or ibuprofen for fever or pain. If you have liver or kidney problems, ask your family doctor if it's okay to take Tylenol or ibuprofen    Take over the counter medications for your symptoms, as directed by your doctor. You may also talk to your pharmacist.      If you have other health problems (like cancer, heart failure, an organ transplant or severe kidney disease): Call your specialty clinic if you don't feel better in the next 2 days.    Know when to call 911. Emergency warning signs include:  o Trouble breathing or shortness of breath  o Pain or pressure in the chest that doesn't go away  o Feeling confused like you haven't felt before, or not being able to wake up  o Bluish-colored lips or face    Where can I get more information?    [facility] - About COVID-19: [email or web address]     CDC - What to Do If You're Sick: https://www.cdc.gov/coronavirus/2019-ncov/if-you-are-sick/index.html     CDC - Quarantine & Isolation: https://www.cdc.gov/coronavirus/2019-ncov/your-health/quarantine-isolation.html     [facility] clinical trials (COVID-19 research studies): [email or web address]    Below are the COVID-19 hotlines at the [facility] ([facility]). Interpreters are available.  o For health questions: Call [phone] or [phone] (7 a.m. to 7 p.m.)  o For questions about schools and childcare: Call  [phone] or [phone] (7 a.m. to 7 p.m.)

## 2022-11-24 NOTE — PATIENT INSTRUCTIONS
Dear Deepti,      I have placed orders for strep, flu, and covid.    Will I be tested for COVID-19?  We would like to test you for COVID. I have placed orders for this test.     To schedule: go to your [employer] home page and scroll down to the section that says  You have an appointment that needs to be scheduled  and click the large green button that says  Schedule Now  and follow the steps to find the next available openings.    If you are unable to complete these [employer] scheduling steps, please call [phone] to schedule your testing.     These guidelines are for isolating before returning to work, school or .     For employers, schools and day cares: This is an official notice for this person s medical guidelines for returning in-person.     For health care sites: The CDC gives different isolation and quarantine guidelines for healthcare sites, please check with these sites before arriving.     How do I self-isolate?  You isolate when you have symptoms of COVID or a test shows you have COVID, even if you don t have symptoms.     If you DO have symptoms:  o Stay home and away from others  - For at least 5 days after your symptoms started, AND   - You are fever free for 24 hours (with no medicine that reduces fever), AND  - Your other symptoms are better.  o Wear a mask for 10 full days any time you are around others.    If you DON T have symptoms:  o Stay at home and away from others for at least 5 days after your positive test.  o Wear a mask for 10 full days any time you are around others.    How can I take care of myself?  Over the counter medications may help with your symptoms such as runny or stuffy nose, cough, chills, or fever.  Talk to your care team about your options.     Some people are at high risk of severe illness (for example, you have a weak immune system, you re 65 years or older, or you have certain medical problems). If your risk is high and your symptoms started in the last 5 to 7  days, we strongly recommend for you to get COVID treatment as soon as possible. Paxlovid, Molnupiravir and the monoclonal antibody treatments are proven safe and effective, make you feel better faster, and prevent hospitalization and death.       To schedule an appointment to discuss COVID treatment, request an appointment on MyChart (select  COVID-19 Treatment ) or call [facility] ([phone]), press 7.      Get lots of rest. Drink extra fluids (unless a doctor has told you not to)    Take Tylenol (acetaminophen) or ibuprofen for fever or pain. If you have liver or kidney problems, ask your family doctor if it's okay to take Tylenol or ibuprofen    Take over the counter medications for your symptoms, as directed by your doctor. You may also talk to your pharmacist.      If you have other health problems (like cancer, heart failure, an organ transplant or severe kidney disease): Call your specialty clinic if you don't feel better in the next 2 days.    Know when to call 911. Emergency warning signs include:  o Trouble breathing or shortness of breath  o Pain or pressure in the chest that doesn't go away  o Feeling confused like you haven't felt before, or not being able to wake up  o Bluish-colored lips or face    Where can I get more information?    [facility] - About COVID-19: [email or web address]     CDC - What to Do If You're Sick: https://www.cdc.gov/coronavirus/2019-ncov/if-you-are-sick/index.html     CDC - Quarantine & Isolation: https://www.cdc.gov/coronavirus/2019-ncov/your-health/quarantine-isolation.html     [facility] clinical trials (COVID-19 research studies): [email or web address].[email or web address]-clinical-trials    Below are the COVID-19 hotlines at the [facility] ([facility]). Interpreters are available.  o For health questions: Call 652-698-3534 or 1-158.121.2688 (7 a.m. to 7 p.m.)  o For questions about schools and childcare: Call 612-983-2140 or  3-464-223-4856 (7 a.m. to 7 p.m.)

## 2023-04-16 ENCOUNTER — APPOINTMENT (OUTPATIENT)
Dept: GENERAL RADIOLOGY | Facility: CLINIC | Age: 33
End: 2023-04-16
Attending: PHYSICIAN ASSISTANT
Payer: COMMERCIAL

## 2023-04-16 ENCOUNTER — HOSPITAL ENCOUNTER (EMERGENCY)
Facility: CLINIC | Age: 33
Discharge: HOME | End: 2023-04-16
Attending: PHYSICIAN ASSISTANT | Admitting: PHYSICIAN ASSISTANT
Payer: COMMERCIAL

## 2023-04-16 VITALS
RESPIRATION RATE: 16 BRPM | OXYGEN SATURATION: 97 % | WEIGHT: 130 LBS | BODY MASS INDEX: 24.55 KG/M2 | TEMPERATURE: 97.8 F | HEART RATE: 72 BPM | DIASTOLIC BLOOD PRESSURE: 66 MMHG | HEIGHT: 61 IN | SYSTOLIC BLOOD PRESSURE: 122 MMHG

## 2023-04-16 DIAGNOSIS — S60.351A: ICD-10-CM

## 2023-04-16 DIAGNOSIS — S61.031A PUNCTURE WOUND OF RIGHT THUMB, INITIAL ENCOUNTER: ICD-10-CM

## 2023-04-16 PROCEDURE — 99284 EMERGENCY DEPT VISIT MOD MDM: CPT | Mod: 25

## 2023-04-16 PROCEDURE — 250N000013 HC RX MED GY IP 250 OP 250 PS 637: Performed by: PHYSICIAN ASSISTANT

## 2023-04-16 PROCEDURE — 73140 X-RAY EXAM OF FINGER(S): CPT | Mod: RT

## 2023-04-16 PROCEDURE — 99283 EMERGENCY DEPT VISIT LOW MDM: CPT | Mod: 25

## 2023-04-16 PROCEDURE — 10120 INC&RMVL FB SUBQ TISS SMPL: CPT

## 2023-04-16 RX ORDER — CIPROFLOXACIN 500 MG/1
500 TABLET, FILM COATED ORAL 2 TIMES DAILY
Qty: 6 TABLET | Refills: 0 | Status: SHIPPED | OUTPATIENT
Start: 2023-04-16 | End: 2023-04-19

## 2023-04-16 RX ORDER — CEPHALEXIN 500 MG/1
500 CAPSULE ORAL 4 TIMES DAILY
Qty: 20 CAPSULE | Refills: 0 | Status: SHIPPED | OUTPATIENT
Start: 2023-04-16 | End: 2023-04-21

## 2023-04-16 RX ORDER — ACETAMINOPHEN 500 MG
500 TABLET ORAL ONCE
Status: COMPLETED | OUTPATIENT
Start: 2023-04-16 | End: 2023-04-16

## 2023-04-16 RX ADMIN — ACETAMINOPHEN 500 MG: 500 TABLET ORAL at 20:11

## 2023-04-16 ASSESSMENT — ACTIVITIES OF DAILY LIVING (ADL): ADLS_ACUITY_SCORE: 35

## 2023-04-17 NOTE — DISCHARGE INSTRUCTIONS
Keep finger clean and dry.  Continue supportive cares at home including rest, ice to affected thumb, and Tylenol or Motrin for pain.  You will be sent home with antibiotics; please use as directed.  Follow-up with primary care if symptoms do not improve within the next 1 to 2 weeks.  For any new or worsening symptoms including fevers, vomiting, spreading redness or purulent discharge from wound, return to emergency department.

## 2023-04-17 NOTE — ED PROVIDER NOTES
"  History     Chief Complaint:  Finger (Right thumb)       HPI   Deepti Mancia is a 32 year old female with no significant history presents with right thumb injury.  Patient states she was using a drill on a shower drain this evening when the drill slipped.  Patient states the drill went through her right thumb.  She has an entrance wound on the distal posterior thumb and an exit wound along the anterior distal thumb.  No nail involvement.  Bleeding is controlled.  Patient is able to move her thumb without difficulty.  Tetanus is current from 2022.  No no other injuries.    Independent Historian:   None - Patient Only    Review of External Notes: None     ROS:  Review of Systems    Allergies:  Sulfa Drugs     Medications:    cephALEXin (KEFLEX) 500 MG capsule  ciprofloxacin (CIPRO) 500 MG tablet        Past Medical History:    Past Medical History:   Diagnosis Date     No pertinent past medical history        Past Surgical History:    Past Surgical History:   Procedure Laterality Date     NO HISTORY OF SURGERY          Family History:    family history includes Breast Cancer in her maternal grandmother; Cancer in her mother; Cerebrovascular Disease in her father; Coronary Artery Disease (age of onset: 59) in her father; Hypothyroidism in her mother and sister; Melanoma in her maternal grandmother; Rheumatoid Arthritis in her father.    Social History:   reports that she has never smoked. She has never used smokeless tobacco. She reports current alcohol use. She reports that she does not use drugs.  PCP: Vanesa Ramirez     Physical Exam     Patient Vitals for the past 24 hrs:   BP Temp Temp src Pulse Resp SpO2 Height Weight   04/16/23 1914 122/66 97.8  F (36.6  C) Oral 72 16 97 % 1.549 m (5' 1\") 59 kg (130 lb)        Physical Exam  Constitutional: Alert, attentive, GCS 15  HENT:    Nose: Nose normal.    Mouth/Throat: Oropharynx is clear, mucous membranes are moist   Eyes: EOM are normal.   CV: regular rate and " rhythm; no murmurs, rubs or gallups  Chest: Effort normal and breath sounds normal.   GI:  There is no tenderness. No distension. Normal bowel sounds  MSK: Normal range of motion of right thumb including flexion and extension.  Neurological: Alert, attentive  Skin: Skin is warm and dry. Small puncture wound no distal right thumb. There appears to be an abrasion to the posterior distal thumb below the nail and deeper puncture wound to the anterior distal thumb pad. No active bleeding.  Brisk capillary blood cell.    Emergency Department Course     Imaging:  XR Finger Right G/E 2 Views   Final Result   IMPRESSION:   1.  Normal joint spacing and alignment.   2.  No fracture.   3.  Tiny foci of metallic density in the soft tissues of the palmar thumb at the level of the distal phalanx base.            Report per radiology    Procedures      Foreign Body Removal     Procedure: Foreign Body Removal    Consent: Verbal    Risks Discussed: pain, bleeding, damage to adjacent structures, incomplete removal, infection and repeat attempts    Indication: Foreign body     Location: Subcutaneous right thumb    Preparation: Alcohol    Anesthesia/Sedation: Digital Block: A digital block was performed with Lidocaine - 1% on the affected digit.    Procedure Detail:   Technique instruments: forceps  Description: The foreign body was located and removed.     Patient Status: The patient tolerated the procedure well: Yes. There were no complications.    Emergency Department Course & Assessments:     Interventions:  Medications   acetaminophen (TYLENOL) tablet 500 mg (500 mg Oral $Given 4/16/23 2011)        Assessments:  2005 I obtained patient history and performed physical examination as above.  2115 I rechecked patient and updated her on findings.  Bedside wound evaluation at this time.    Independent Interpretation (X-rays, CTs, rhythm strip):  Very small foreign bodies identified in distal thumb on  radiograph.    Consultations/Discussion of Management or Tests:  None        Social Determinants of Health affecting care:   None    Disposition:  The patient was discharged to home.     Impression & Plan    CMS Diagnoses: None    Medical Decision Making:  Patient is a well-appearing 32-year-old female who has a puncture wound to the right distal thumb after mechanical drill slipped while patient was working on shower this evening.  Vitals are appropriate upon arrival.  Physical examination reveals superficial abrasion to the base of the right thumbnail.  There is a puncture wound on the distal thumb pad with minor bleeding.  No nail involvement.  Preliminary x-rays obtained.  No fracture or joint dislocation noted.  There is a small group of possibly metallic foreign bodies in the distal thumb.  Results were reviewed and shared with patient.  Digital block was applied to thumb.  Thorough irrigation with normal saline was completed.  I used a forceps to remove any identifiable metal pieces.  Patient tolerated procedure well.  Dressing was applied to thumb.  Tetanus is up-to-date.  I will send her home with Keflex and also ciprofloxacin due to possible Pseudomonas exposure.  She may follow-up with primary care if symptoms do not improve in the next 1 to 2 weeks.  Questions were answered.  Return precautions to ED including fevers, vomiting, spreading infection were discussed with patient.  Supportive cares were discussed and patient was ready for discharge.    Diagnosis:    ICD-10-CM    1. Puncture wound of right thumb, initial encounter  S61.031A       2. Acute foreign body of thumb, right, initial encounter  S60.351A            Discharge Medications:  New Prescriptions    CEPHALEXIN (KEFLEX) 500 MG CAPSULE    Take 1 capsule (500 mg) by mouth 4 times daily for 5 days    CIPROFLOXACIN (CIPRO) 500 MG TABLET    Take 1 tablet (500 mg) by mouth 2 times daily for 3 days        4/16/2023   Sandra Whiteside PA-C         Sandra Whiteside PA-C  04/16/23 3729

## 2023-07-20 ENCOUNTER — PATIENT OUTREACH (OUTPATIENT)
Dept: CARE COORDINATION | Facility: CLINIC | Age: 33
End: 2023-07-20
Payer: COMMERCIAL

## 2023-08-03 ENCOUNTER — PATIENT OUTREACH (OUTPATIENT)
Dept: CARE COORDINATION | Facility: CLINIC | Age: 33
End: 2023-08-03
Payer: COMMERCIAL

## 2023-11-11 ENCOUNTER — HEALTH MAINTENANCE LETTER (OUTPATIENT)
Age: 33
End: 2023-11-11

## 2023-12-27 ENCOUNTER — OFFICE VISIT (OUTPATIENT)
Dept: URGENT CARE | Facility: URGENT CARE | Age: 33
End: 2023-12-27
Payer: COMMERCIAL

## 2023-12-27 VITALS
SYSTOLIC BLOOD PRESSURE: 109 MMHG | DIASTOLIC BLOOD PRESSURE: 70 MMHG | OXYGEN SATURATION: 97 % | HEART RATE: 77 BPM | BODY MASS INDEX: 25.51 KG/M2 | TEMPERATURE: 98.4 F | WEIGHT: 135 LBS

## 2023-12-27 DIAGNOSIS — J03.90 EXUDATIVE TONSILLITIS: Primary | ICD-10-CM

## 2023-12-27 DIAGNOSIS — R07.0 THROAT PAIN: ICD-10-CM

## 2023-12-27 LAB
DEPRECATED S PYO AG THROAT QL EIA: NEGATIVE
GROUP A STREP BY PCR: NOT DETECTED

## 2023-12-27 PROCEDURE — 87651 STREP A DNA AMP PROBE: CPT | Performed by: PHYSICIAN ASSISTANT

## 2023-12-27 PROCEDURE — 99214 OFFICE O/P EST MOD 30 MIN: CPT | Performed by: PHYSICIAN ASSISTANT

## 2023-12-27 RX ORDER — CLINDAMYCIN HCL 300 MG
300 CAPSULE ORAL 3 TIMES DAILY
Qty: 30 CAPSULE | Refills: 0 | Status: SHIPPED | OUTPATIENT
Start: 2023-12-27 | End: 2024-01-06

## 2023-12-27 RX ORDER — FLUCONAZOLE 150 MG/1
TABLET ORAL
Qty: 1 TABLET | Refills: 0 | Status: SHIPPED | OUTPATIENT
Start: 2023-12-27

## 2023-12-27 NOTE — LETTER
December 27, 2023      Deepti Mancia  [address] [facility] [address]        To Whom It May Concern:    Deepti Mancia was seen in our clinic for illness today.  .      Sincerely,            Monse BRADLEY, GODWIN

## 2023-12-27 NOTE — PROGRESS NOTES
Patient presents with:  Pharyngitis: For 3 days       (J03.90) Exudative tonsillitis  (primary encounter diagnosis)  Comment:   Plan: clindamycin (CLEOCIN) 300 MG capsule,         fluconazole (DIFLUCAN) 150 MG tablet            (R07.0) Throat pain  Comment:   Plan: Streptococcus A Rapid Screen w/Reflex to PCR -         Clinic Collect, Group A Streptococcus PCR         Throat Swab            Salt water gargles.      Tylenol or ibuprofen as needed.    Replace toothbrush in 48 hours    Follow up with ENT should symptoms persist or worsen.          At the end of the encounter, I discussed results, diagnosis, medications. Discussed red flags for immediate return to clinic/ER, as well as indications for follow up if no improvement. Patient understood and agreed to plan. Patient was stable for discharge     If not improving or if condition worsens, follow up with your Primary Care Provider            SUBJECTIVE:   Deepti Mancia is a 33 year old female who presents today with throat pain and left sided tonsillar pain for the past 3 days.  Denies any runny nose, congestion or cough.    Patient Active Problem List   Diagnosis   (none) - all problems resolved or deleted         Past Medical History:   Diagnosis Date    No pertinent past medical history          Current Outpatient Medications   Medication Sig Dispense Refill    Multiple Vitamins-Iron (DAILY-OLAF/IRON/BETA-CAROTENE) TABS TAKE 1 TABLET BY MOUTH DAILY. (Patient not taking: Reported on 10/19/2020) 30 tablet 7     Social History     Tobacco Use    Smoking status: Never Smoker    Smokeless tobacco: Never Used   Substance Use Topics    Alcohol use: Not on file     Family History   Problem Relation Age of Onset    Diabetes Mother     Diabetes Father          ROS:    10 point ROS of systems including Constitutional, Eyes, Respiratory, Cardiovascular, Gastroenterology, Genitourinary, Integumentary, Muscularskeletal, Psychiatric ,neurological were all negative except for  pertinent positives noted in my HPI       OBJECTIVE:  /70   Pulse 77   Temp 98.4  F (36.9  C) (Tympanic)   Wt 61.2 kg (135 lb)   SpO2 97%   BMI 25.51 kg/m    Physical Exam:  GENERAL APPEARANCE: healthy, alert and no distress  EYES: EOMI,  PERRL, conjunctiva clear  HENT: ear canals and TM's normal.  Nose and mouth without ulcers, erythema or lesions  HENT: tonsillar hypertrophy, tonsillar erythema, and tonsillar exudate  NECK: supple, with left sided anterior cervical lymphadenopathy  RESP: lungs clear to auscultation - no rales, rhonchi or wheezes  CV: regular rates and rhythm, normal S1 S2, no murmur noted  NEURO: Normal strength and tone, sensory exam grossly normal,  normal speech and mentation  SKIN: no suspicious lesions or rashes    Results for orders placed or performed in visit on 12/27/23   Streptococcus A Rapid Screen w/Reflex to PCR - Clinic Collect     Status: Normal    Specimen: Throat; Swab   Result Value Ref Range    Group A Strep antigen Negative Negative

## 2023-12-27 NOTE — PATIENT INSTRUCTIONS
(J03.90) Exudative tonsillitis  (primary encounter diagnosis)  Comment:   Plan: clindamycin (CLEOCIN) 300 MG capsule,         fluconazole (DIFLUCAN) 150 MG tablet            (R07.0) Throat pain  Comment:   Plan: Streptococcus A Rapid Screen w/Reflex to PCR -         Clinic Collect, Group A Streptococcus PCR         Throat Swab            Salt water gargles.      Tylenol or ibuprofen as needed.    Replace toothbrush in 48 hours    Follow up with ENT should symptoms persist or worsen.

## 2024-07-12 NOTE — PROGRESS NOTES
Assessment & Plan     Throat pain  Throat pain and swelling > 2 weeks with no relief  Has had neg covid and strep testing  Mono test negative and CBC normal  EBV titer drawn to check for antibodys  Due to duration of symptoms and severity will try course of medications  Follow up with ENT if this does not work  - EBV Capsid Antibody IgM; Future  - CBC with platelets and differential; Future  - Mononucleosis screen; Future  - EBV Capsid Antibody IgM  - CBC with platelets and differential  - Mononucleosis screen  - predniSONE (DELTASONE) 20 MG tablet; Take 1 tablet (20 mg) by mouth 2 times daily    Tonsillitis with exudate  Clindamycin for bacterial tonsillitis with better broad spectrum coverage of mouth and throat naveed  Prednisone for throat swelling  Magic mouthwash for throat pain  If symptoms fail to improve with treatment then advise ENT consult  Patient agrees with plan and will follow up accordingly  - clindamycin (CLEOCIN) 300 MG capsule; Take 1 capsule (300 mg) by mouth 3 times daily for 10 days  - magic mouthwash (ENTER INGREDIENTS IN COMMENTS) suspension; Swish and spit 5-10 mLs in mouth every 6 hours as needed 30 ml of Benadryl (12.5 mg/5 ml), 60 ml Maalox, 30 ml Viscous Lidocaine  - Otolaryngology Referral; Future    Lymphadenitis  Prednisone for swelling  Clindamycin for infection  - clindamycin (CLEOCIN) 300 MG capsule; Take 1 capsule (300 mg) by mouth 3 times daily for 10 days  - Otolaryngology Referral; Future     CONSULTATION/REFERRAL to ENT if symptoms persist    No follow-ups on file.    Medhat Woodward PA-C  [facility]      Results for orders placed or performed in visit on 03/29/22   Mononucleosis screen     Status: Normal   Result Value Ref Range    Mononucleosis Screen Negative Negative   CBC with platelets and differential     Status: Abnormal   Result Value Ref Range    WBC Count 10.3 4.0 - 11.0 10e3/uL    RBC Count 4.28 3.80 - 5.20 10e6/uL    Hemoglobin 12.3  A user error has taken place: encounter opened in error, closed for administrative reasons.         11.7 - 15.7 g/dL    Hematocrit 39.4 35.0 - 47.0 %    MCV 92 78 - 100 fL    MCH 28.7 26.5 - 33.0 pg    MCHC 31.2 (L) 31.5 - 36.5 g/dL    RDW 12.8 10.0 - 15.0 %    Platelet Count 398 150 - 450 10e3/uL    % Neutrophils 69 %    % Lymphocytes 21 %    % Monocytes 9 %    % Eosinophils 1 %    % Basophils 0 %    Absolute Neutrophils 7.1 1.6 - 8.3 10e3/uL    Absolute Lymphocytes 2.1 0.8 - 5.3 10e3/uL    Absolute Monocytes 0.9 0.0 - 1.3 10e3/uL    Absolute Eosinophils 0.1 0.0 - 0.7 10e3/uL    Absolute Basophils 0.0 0.0 - 0.2 10e3/uL   CBC with platelets and differential     Status: Abnormal    Narrative    The following orders were created for panel order CBC with platelets and differential.  Procedure                               Abnormality         Status                     ---------                               -----------         ------                     CBC with platelets and d...[[id number]]  Abnormal            Final result                 Please view results for these tests on the individual orders.       Subjective   Deepti is a 31 year old who presents for the following health issues     HPI     Deepti Mancia, 31 year old, female presents to the urgent care today with:   Urgent Care (Sore throat, nasal drainage down back of throat and pain in ears for 2 weeks.  Pt reports she tested negative from covid/strep a few days ago. )    Review of Systems   Constitutional, HEENT, cardiovascular, pulmonary, gi and gu systems are negative, except as otherwise noted.      Objective    /82   Pulse 79   Temp 97.9  F (36.6  C) (Tympanic)   SpO2 96%   There is no height or weight on file to calculate BMI.  Physical Exam   GENERAL: healthy, alert and no distress  EYES: Eyes grossly normal to inspection, PERRL and conjunctivae and sclerae normal  HENT: normal cephalic/atraumatic, ear canals and TM's normal, nose and mouth without ulcers or lesions, tonsillar hypertrophy and tonsillar erythema  NECK: cervical adenopathy  present bilaterally and thyroid normal to palpation  RESP: lungs clear to auscultation - no rales, rhonchi or wheezes  CV: regular rate and rhythm, normal S1 S2, no S3 or S4, no murmur, click or rub, no peripheral edema and peripheral pulses strong  MS: no gross musculoskeletal defects noted, no edema  SKIN: no suspicious lesions or rashes  NEURO: Normal strength and tone, mentation intact and speech normal  PSYCH: mentation appears normal, affect normal/bright

## 2024-12-22 ENCOUNTER — HEALTH MAINTENANCE LETTER (OUTPATIENT)
Age: 34
End: 2024-12-22